# Patient Record
Sex: FEMALE | Employment: FULL TIME | URBAN - METROPOLITAN AREA
[De-identification: names, ages, dates, MRNs, and addresses within clinical notes are randomized per-mention and may not be internally consistent; named-entity substitution may affect disease eponyms.]

---

## 2018-12-18 ENCOUNTER — PATIENT OUTREACH (OUTPATIENT)
Dept: HEALTH INFORMATION MANAGEMENT | Facility: OTHER | Age: 68
End: 2018-12-18

## 2018-12-18 NOTE — PROGRESS NOTES
Patient called because she received a letter in the mail stating she is due for a colonoscopy and patient stated she completed on in 2011 at [facility]. Called and did request the records through phone.